# Patient Record
Sex: MALE | Race: OTHER | Employment: OTHER | URBAN - METROPOLITAN AREA
[De-identification: names, ages, dates, MRNs, and addresses within clinical notes are randomized per-mention and may not be internally consistent; named-entity substitution may affect disease eponyms.]

---

## 2018-04-23 ENCOUNTER — OFFICE VISIT (OUTPATIENT)
Dept: FAMILY MEDICINE CLINIC | Age: 78
End: 2018-04-23

## 2018-04-23 ENCOUNTER — HOSPITAL ENCOUNTER (OUTPATIENT)
Dept: LAB | Age: 78
Discharge: HOME | End: 2018-04-23

## 2018-04-23 VITALS
HEART RATE: 91 BPM | HEIGHT: 65 IN | DIASTOLIC BLOOD PRESSURE: 76 MMHG | SYSTOLIC BLOOD PRESSURE: 140 MMHG | BODY MASS INDEX: 30.16 KG/M2 | TEMPERATURE: 98.4 F | WEIGHT: 181 LBS

## 2018-04-23 DIAGNOSIS — E11.9 DM TYPE 2, GOAL A1C TO BE DETERMINED (HCC): Primary | ICD-10-CM

## 2018-04-23 DIAGNOSIS — M17.0 ARTHRITIS OF BOTH KNEES: ICD-10-CM

## 2018-04-23 LAB
ALBUMIN SERPL-MCNC: 4.2 G/DL (ref 3.5–5)
ALBUMIN/GLOB SERPL: 1.1 {RATIO} (ref 1.1–2.2)
ALP SERPL-CCNC: 90 U/L (ref 45–117)
ALT SERPL-CCNC: 109 U/L (ref 12–78)
ANION GAP SERPL CALC-SCNC: 11 MMOL/L (ref 5–15)
AST SERPL-CCNC: 57 U/L (ref 15–37)
BILIRUB SERPL-MCNC: 0.6 MG/DL (ref 0.2–1)
BUN SERPL-MCNC: 24 MG/DL (ref 6–20)
BUN/CREAT SERPL: 23 (ref 12–20)
CALCIUM SERPL-MCNC: 9.1 MG/DL (ref 8.5–10.1)
CHLORIDE SERPL-SCNC: 96 MMOL/L (ref 97–108)
CHOLEST SERPL-MCNC: 190 MG/DL
CO2 SERPL-SCNC: 31 MMOL/L (ref 21–32)
CREAT SERPL-MCNC: 1.03 MG/DL (ref 0.7–1.3)
EST. AVERAGE GLUCOSE BLD GHB EST-MCNC: 183 MG/DL
GLOBULIN SER CALC-MCNC: 4 G/DL (ref 2–4)
GLUCOSE SERPL-MCNC: 129 MG/DL (ref 65–100)
HBA1C MFR BLD: 8 % (ref 4.2–6.3)
HDLC SERPL-MCNC: 77 MG/DL
HDLC SERPL: 2.5 {RATIO} (ref 0–5)
LDLC SERPL CALC-MCNC: 97.4 MG/DL (ref 0–100)
LIPID PROFILE,FLP: NORMAL
POTASSIUM SERPL-SCNC: 4.2 MMOL/L (ref 3.5–5.1)
PROT SERPL-MCNC: 8.2 G/DL (ref 6.4–8.2)
SODIUM SERPL-SCNC: 138 MMOL/L (ref 136–145)
TRIGL SERPL-MCNC: 78 MG/DL (ref ?–150)
VLDLC SERPL CALC-MCNC: 15.6 MG/DL

## 2018-04-23 PROCEDURE — 83036 HEMOGLOBIN GLYCOSYLATED A1C: CPT | Performed by: PHYSICIAN ASSISTANT

## 2018-04-23 PROCEDURE — 80053 COMPREHEN METABOLIC PANEL: CPT | Performed by: PHYSICIAN ASSISTANT

## 2018-04-23 PROCEDURE — 80061 LIPID PANEL: CPT | Performed by: PHYSICIAN ASSISTANT

## 2018-04-23 RX ORDER — GLIMEPIRIDE 4 MG/1
4 TABLET ORAL 2 TIMES DAILY
Qty: 60 TAB | Refills: 2 | Status: SHIPPED | OUTPATIENT
Start: 2018-04-23 | End: 2022-07-21 | Stop reason: ALTCHOICE

## 2018-04-23 RX ORDER — METFORMIN HYDROCHLORIDE 1000 MG/1
1000 TABLET ORAL 2 TIMES DAILY WITH MEALS
Qty: 60 TAB | Refills: 3 | Status: SHIPPED | OUTPATIENT
Start: 2018-04-23 | End: 2018-07-25 | Stop reason: SDUPTHER

## 2018-04-23 RX ORDER — DICLOFENAC SODIUM 75 MG/1
75 TABLET, DELAYED RELEASE ORAL 2 TIMES DAILY
Qty: 60 TAB | Refills: 1 | Status: SHIPPED | OUTPATIENT
Start: 2018-04-23 | End: 2022-07-21 | Stop reason: ALTCHOICE

## 2018-04-23 NOTE — PATIENT INSTRUCTIONS
Artritis: Instrucciones de cuidado - [ Arthritis: Care Instructions ]  Instrucciones de cuidado  La artritis, también llamada osteoartritis, es un desgaste del cartílago que amortigua las articulaciones. Cuando el cartílago se desgasta, los huesos se frotan entre sí. [place] causa dolor y rigidez. Muchas personas tienen algo de artritis a medida que envejecen. Con mayor frecuencia, la artritis afecta las articulaciones de la columna vertebral, las marcus, las [address] pies. Usted puede torrey [employer] sencillas para proteger pepe articulaciones, aliviar el dolor y ayudarle a permanecer activo. La atención de seguimiento es kimberlee parte clave de heller tratamiento y seguridad. Asegúrese de hacer y acudir a todas las citas, y llame a heller médico si está teniendo problemas. También es kimberlee buena idea saber los resultados de los exámenes y mantener kimberlee lista de los medicamentos que serafin. ¿Cómo puede cuidarse en el hogar? · Mantenga un peso saludable. Tener sobrepeso significa un esfuerzo adicional para las articulaciones. · Hable con heller médico o fisioterapeuta acerca de los ejercicios que le ayudarán a aliviar el dolor de las articulaciones. ¨ Estírese. Es posible que pueda disfrutar de formas suaves de yoga para ayudar a mantener flexibles las articulaciones y los músculos. ¨ Camine en lugar de correr. Otros tipos de ejercicio que sobrecargan menos las articulaciones incluyen montar en jahaira Douglass, denise chi o hacer ejercicios en el agua. ¨ Levante pesas. Tener los músculos tanya ayuda a reducir la carga en las articulaciones. Por ejemplo, los músculos más tanya en los muslos reducen parte de la carga sobre las rodillas y las caderas. Aprenda la manera correcta de levantar pesas para no empeorar el dolor de las articulaciones. · [employer] medicamentos exactamente elliot le fueron recetados. Llame a heller médico si silvina estar teniendo problemas con heller medicamento.   · [employer] analgésicos (medicamentos para el dolor) exactamente según las indicaciones. ¨ Si el médico le recetó un analgésico, tómelo según las indicaciones. ¨ Si no está tomando un analgésico recetado, pregúntele a heller médico si puede torrey kristian de [employer]. · Si necesita ayuda para desplazarse, use un bastón, kimberlee Vouni, un andador u otro aparato. Pueden ayudar a descansar las articulaciones. [employer] usar otras cosas para facilitarse la ivy, elliot un asiento de inodoro más alto y asas acolchadas en los utensilios de cocina. · No se siente en justine bajas, ya que puede resultarle difícil ponerse de pie. · Ponga calor o frío sobre las articulaciones adoloridas según sea necesario. Use lo que [employer]. También puede alternar las compresas calientes y frías. ¨ Aplíquese calor 2 o 3 veces al día por entre 20 y 27 minutos, usando kimberlee almohadilla térmica, Ervin Librado ducha caliente o kimberlee compresa caliente, para aliviar el dolor y la rigidez. ¨ Colóquese hielo o kimberlee compresa fría en la articulación adolorida por entre 10 y 21 minutos cada vez. Póngase un paño mark entre el hielo y la piel. ¿Cuándo debe pedir ayuda? Llame a heller médico ahora mismo o busque atención médica inmediata si:  ? · Tiene hinchazón, calor o dolor repentinos en alguna articulación. ? · Marcene Freshwater kimberlee articulación y tiene fiebre o salpullido. ? · El dolor es tan paco que no puede usar kimberlee articulación. ?Preste especial atención a los cambios en heller franca y asegúrese de comunicarse con heller médico si:  ? · Tiene síntomas articulares leves que continúan aun con más de 6 semanas de cuidado en el hogar. ? · Tiene dolor de estómago u otros problemas con pepe medicamentos. ¿Dónde puede encontrar más información en inglés? Edgar Litter a http://benji-tali.info/. Yusef Eid K412 en la búsqueda para aprender más acerca de \"Artritis: Instrucciones de cuidado - [ Arthritis: Care Instructions ]. \"  Revisado: 31 octubre, 2016  Versión del contenido: 11.4  © 5449-2542 Healthwise, Incorporated. Las instrucciones de cuidado fueron adaptadas bajo licencia por [employer] (which disclaims liability or warranty for this information). Si usted tiene [place] afección médica o sobre estas instrucciones, siempre pregunte a heller profesional de franca. Healthwise, Incorporated niega toda garantía o responsabilidad por heller uso de esta información.

## 2018-04-23 NOTE — PROGRESS NOTES
Assessment/Plan:    Diagnoses and all orders for this visit:    1. DM type 2, goal A1C to be determined [facility]  -     LIPID PANEL; Future  -     METABOLIC PANEL, COMPREHENSIVE; Future  -     HEMOGLOBIN A1C WITH EAG; Future  -     metFORMIN (GLUCOPHAGE) 1,000 mg tablet; Take 1 Tab by mouth two (2) times daily (with meals). -     glimepiride (AMARYL) 4 mg tablet; Take 1 Tab by mouth two (2) times a day. This is the dose his doctor has him on  -     insulin NPH (HUMULIN N NPH U-100 INSULIN) 100 unit/mL injection; Si units bid    2. Arthritis of both knees  -     diclofenac EC (VOLTAREN) 75 mg EC tablet; Take 1 Tab by mouth two (2) times a day. por pina perez, nico Alva visiting from [place] and needs to have his medication filled while here. He is on insulin N 20 units bid. He does not have a glucometer to check his blood sugar   Will follow for results of his labs     Follow-up Disposition:  Return if symptoms worsen or fail to improve. Eda Hi PA-C  [employer] expressed understanding of this plan. An AVS was printed and given to the patient.      ----------------------------------------------------------------------    Chief Complaint   Patient presents with    Leg Pain     burning and painful censation in both legs    Diabetes     refills and f/u on his glucose. History of Present Illness:  Pt presents for refill on his meds. Since his last appt here, his doc in [place] started him on N insulin 20 units bid and glimperide and continued on the metformin. I did not change the meds today (he is on 4 mg bid of the glimperide) and 1gm bid of the metformin. He does not have a glucometer. He will f/up with his doc upon his return in a few months. He continues to have significant pain in his medardo knees.  He would like to have medication for this          Past Medical History:   Diagnosis Date    Diabetes [facility])        Current Outpatient Prescriptions Medication Sig Dispense Refill    metFORMIN (GLUCOPHAGE) 1,000 mg tablet Take 1 Tab by mouth two (2) times daily (with meals). 60 Tab 3    glimepiride (AMARYL) 4 mg tablet Take 1 Tab by mouth two (2) times a day. This is the dose his doctor has him on 60 Tab 2    insulin NPH (HUMULIN N NPH U-100 INSULIN) 100 unit/mL injection Si units bid 1 Vial 2    diclofenac EC (VOLTAREN) 75 mg EC tablet Take 1 Tab by mouth two (2) times a day. por heller dolor, tome 1 pastilla dos veces al  charly 60 Tab 1    triamcinolone acetonide (KENALOG) 0.1 % ointment Apply  to affected area two (2) times a day. use thin layer 30 g 0    OTHER,NON-FORMULARY, \"Maritza-Norboral\" Generic: Glibenclamida and Metformina 5mg/1000mg PO BID  Medication from Marcellus Rico         No Known Allergies    Social History   Substance Use Topics    Smoking status: Never Smoker    Smokeless tobacco: Never Used    Alcohol use No       No family history on file. Physical Exam:     Visit Vitals    /76 (BP 1 Location: Right arm, BP Patient Position: Sitting)    Pulse 91    Temp 98.4 °F (36.9 °C) (Oral)    Ht 5' 5.35\" (1.66 m)    Wt 181 lb (82.1 kg)    BMI 29.79 kg/m2     Walks with a cane and his gait is altered due to his knee pain   A&Ox3  WDWN NAD  Respirations normal and non labored  Lab Results   Component Value Date/Time    Hemoglobin A1c 8.2 (H) 2016 02:19 PM     Lab Results   Component Value Date/Time    Sodium 137 2016 02:19 PM    Potassium 4.0 2016 02:19 PM    Chloride 101 2016 02:19 PM    CO2 26 2016 02:19 PM    Anion gap 10 2016 02:19 PM    Glucose 159 (H) 2016 02:19 PM    BUN 13 2016 02:19 PM    Creatinine 0.90 2016 02:19 PM    BUN/Creatinine ratio 14 2016 02:19 PM    GFR est AA >60 2016 02:19 PM    GFR est non-AA >60 2016 02:19 PM    Calcium 8.4 (L) 2016 02:19 PM    Bilirubin, total 0.4 2016 02:19 PM    AST (SGOT) 38 (H) 2016 02:19 PM    Alk. phosphatase 80 04/11/2016 02:19 PM    Protein, total 8.5 (H) 04/11/2016 02:19 PM    Albumin 4.5 04/11/2016 02:19 PM    Globulin 4.0 04/11/2016 02:19 PM    A-G Ratio 1.1 04/11/2016 02:19 PM    ALT (SGPT) 64 04/11/2016 02:19 PM     No results found for: CHOL, CHOLPOCT, CHOLX, CHLST, CHOLV, HDL, HDLPOC, LDL, LDLCPOC, LDLC, DLDLP, VLDLC, VLDL, TGLX, TRIGL, TRIGP, TGLPOCT, CHHD, CHHDX

## 2018-04-23 NOTE — LETTER
4/26/2018 1:52 PM 
 
Mr. [id number] Dear Saravanan Schilling: 
 
Please find your most recent results below. Resulted Orders METABOLIC PANEL, COMPREHENSIVE Result Value Ref Range Sodium 138 136 - 145 mmol/L Potassium 4.2 3.5 - 5.1 mmol/L Chloride 96 (L) 97 - 108 mmol/L  
 CO2 31 21 - 32 mmol/L Anion gap 11 5 - 15 mmol/L Glucose 129 (H) 65 - 100 mg/dL BUN 24 (H) 6 - 20 MG/DL Creatinine 1.03 0.70 - 1.30 MG/DL  
 BUN/Creatinine ratio 23 (H) 12 - 20 GFR est AA >60 >60 ml/min/1.73m2 GFR est non-AA >60 >60 ml/min/1.73m2 Comment:  
   Estimated GFR is calculated using the IDMS-traceable Modification of Diet in Renal Disease (MDRD) Study equation, reported for both  Americans (GFRAA) and non- Americans (GFRNA), and normalized to 1.73m2 body surface area. The physician must decide which value applies to the patient. The MDRD study equation should only be used in individuals age 25 or older. It has not been validated for the following: pregnant women, patients with serious comorbid conditions, or on certain medications, or persons with extremes of body size, muscle mass, or nutritional status. Calcium 9.1 8.5 - 10.1 MG/DL Bilirubin, total 0.6 0.2 - 1.0 MG/DL  
 ALT (SGPT) 109 (H) 12 - 78 U/L  
 AST (SGOT) 57 (H) 15 - 37 U/L Alk. phosphatase 90 45 - 117 U/L Protein, total 8.2 6.4 - 8.2 g/dL Albumin 4.2 3.5 - 5.0 g/dL Globulin 4.0 2.0 - 4.0 g/dL A-G Ratio 1.1 1.1 - 2.2 LIPID PANEL Result Value Ref Range LIPID PROFILE Cholesterol, total 190 <200 MG/DL Triglyceride 78 <150 MG/DL Comment:  
   Based on NCEP-ATP III:  Triglycerides <150 mg/dL  is considered normal, 150-199 mg/dL  borderline high,  200-499 mg/dL high and  greater than or equal to 500 mg/dL very high. HDL Cholesterol 77 MG/DL    Comment:  
   Based on NCEP ATP III, HDL Cholesterol <40 mg/dL is considered low and >60 mg/dL is elevated. LDL, calculated 97.4 0 - 100 MG/DL Comment:  
   Based on the NCEP-ATP: LDL-C concentrations are considered  optimal <100 mg/dL, 
near optimal/above Normal 100-129 mg/dL Borderline High: 130-159, High: 160-189 mg/dL Very High: Greater than or equal to 190 mg/dL VLDL, calculated 15.6 MG/DL  
 CHOL/HDL Ratio 2.5 0 - 5.0 HEMOGLOBIN A1C WITH EAG Result Value Ref Range Hemoglobin A1c 8.0 (H) 4.2 - 6.3 % Est. average glucose 183 mg/dL Comment:  
   (NOTE) The eAG should be interpreted with patient characteristics in mind  
since ethnicity, interindividual differences, red cell lifespan,  
variation in rates of glycation, etc. may affect the validity of the  
calculation. RECOMMENDATIONS: Continue to work on Diet and exercise. Please call me if you have any questions: [phone] Sincerely, SITA Reynolds. 
 
LAB OUTREACH CLIENT

## 2018-04-24 NOTE — PROGRESS NOTES
Gera Marroquin seen at d/c, given AVS and reviewed today's visit with patient. One NPH vial given to patient per verbal order of provider Kimberly DUMONT, logged in the log book. Pt also has goodrx card in case needed. All questions were answered to patient satisfaction.

## 2018-07-25 DIAGNOSIS — E11.9 DM TYPE 2, GOAL A1C TO BE DETERMINED (HCC): ICD-10-CM

## 2018-07-27 RX ORDER — METFORMIN HYDROCHLORIDE 1000 MG/1
TABLET ORAL
Qty: 60 TAB | Refills: 3 | Status: SHIPPED | OUTPATIENT
Start: 2018-07-27 | End: 2022-07-21 | Stop reason: DRUGHIGH

## 2022-07-21 ENCOUNTER — OFFICE VISIT (OUTPATIENT)
Dept: FAMILY MEDICINE CLINIC | Age: 82
End: 2022-07-21

## 2022-07-21 ENCOUNTER — HOSPITAL ENCOUNTER (OUTPATIENT)
Dept: LAB | Age: 82
Discharge: HOME | End: 2022-07-21

## 2022-07-21 VITALS
TEMPERATURE: 97.3 F | SYSTOLIC BLOOD PRESSURE: 152 MMHG | HEIGHT: 65 IN | OXYGEN SATURATION: 95 % | WEIGHT: 162 LBS | DIASTOLIC BLOOD PRESSURE: 76 MMHG | BODY MASS INDEX: 26.99 KG/M2 | HEART RATE: 119 BPM

## 2022-07-21 DIAGNOSIS — E11.65 TYPE 2 DIABETES MELLITUS WITH HYPERGLYCEMIA, WITHOUT LONG-TERM CURRENT USE OF INSULIN (HCC): ICD-10-CM

## 2022-07-21 DIAGNOSIS — E11.65 TYPE 2 DIABETES MELLITUS WITH HYPERGLYCEMIA, WITHOUT LONG-TERM CURRENT USE OF INSULIN (HCC): Primary | ICD-10-CM

## 2022-07-21 DIAGNOSIS — I10 PRIMARY HYPERTENSION: ICD-10-CM

## 2022-07-21 DIAGNOSIS — E11.9 DM TYPE 2, GOAL A1C TO BE DETERMINED (HCC): ICD-10-CM

## 2022-07-21 LAB
ALBUMIN SERPL-MCNC: 3 G/DL (ref 3.5–5)
ALBUMIN/GLOB SERPL: 0.7 {RATIO} (ref 1.1–2.2)
ALP SERPL-CCNC: 138 U/L (ref 45–117)
ALT SERPL-CCNC: 32 U/L (ref 12–78)
ANION GAP SERPL CALC-SCNC: 10 MMOL/L (ref 5–15)
AST SERPL-CCNC: 39 U/L (ref 15–37)
BILIRUB SERPL-MCNC: 1.4 MG/DL (ref 0.2–1)
BUN SERPL-MCNC: 10 MG/DL (ref 6–20)
BUN/CREAT SERPL: 14 (ref 12–20)
CALCIUM SERPL-MCNC: 8.5 MG/DL (ref 8.5–10.1)
CHLORIDE SERPL-SCNC: 97 MMOL/L (ref 97–108)
CO2 SERPL-SCNC: 24 MMOL/L (ref 21–32)
CREAT SERPL-MCNC: 0.72 MG/DL (ref 0.7–1.3)
CREAT UR-MCNC: 76.4 MG/DL
EST. AVERAGE GLUCOSE BLD GHB EST-MCNC: 223 MG/DL
GLOBULIN SER CALC-MCNC: 4.2 G/DL (ref 2–4)
GLUCOSE POC: NORMAL MG/DL
GLUCOSE SERPL-MCNC: 238 MG/DL (ref 65–100)
HBA1C MFR BLD: 9.4 % (ref 4–5.6)
MICROALBUMIN UR-MCNC: 0.78 MG/DL
MICROALBUMIN/CREAT UR-RTO: 10 MG/G (ref 0–30)
POTASSIUM SERPL-SCNC: 3.6 MMOL/L (ref 3.5–5.1)
PROT SERPL-MCNC: 7.2 G/DL (ref 6.4–8.2)
SODIUM SERPL-SCNC: 131 MMOL/L (ref 136–145)

## 2022-07-21 PROCEDURE — 83036 HEMOGLOBIN GLYCOSYLATED A1C: CPT

## 2022-07-21 PROCEDURE — 82043 UR ALBUMIN QUANTITATIVE: CPT

## 2022-07-21 PROCEDURE — 82962 GLUCOSE BLOOD TEST: CPT | Performed by: NURSE PRACTITIONER

## 2022-07-21 PROCEDURE — 3046F HEMOGLOBIN A1C LEVEL >9.0%: CPT | Performed by: NURSE PRACTITIONER

## 2022-07-21 PROCEDURE — 80053 COMPREHEN METABOLIC PANEL: CPT

## 2022-07-21 PROCEDURE — 99215 OFFICE O/P EST HI 40 MIN: CPT | Performed by: NURSE PRACTITIONER

## 2022-07-21 PROCEDURE — 1123F ACP DISCUSS/DSCN MKR DOCD: CPT | Performed by: NURSE PRACTITIONER

## 2022-07-21 RX ORDER — METFORMIN HYDROCHLORIDE 500 MG/1
500 TABLET, EXTENDED RELEASE ORAL
Qty: 90 TABLET | Refills: 0 | Status: SHIPPED | OUTPATIENT
Start: 2022-07-21 | End: 2022-08-29

## 2022-07-21 NOTE — PROGRESS NOTES
Rosa Pantoja seen at d/c, full name and  verified, given After visit Summary and reviewed today's visit with patient along with instructions on when it is recommended to come back. I reviewed the medication list with the patient to ensure he knows how to and when to take his medications. Side effects, mechanisms of action and medication compliance were reiterated to ensure proper understanding. I have reviewed the provider's instructions with the patient, answering all questions to his satisfaction. Patient verbalized understanding.   Linda Mcwilliams RN

## 2022-07-21 NOTE — PROGRESS NOTES
Coordination of Care  1. Have you been to the ER, urgent care clinic since your last visit? Hospitalized since your last visit? Yes When: 2/2022-Katherine-Covid-19    2. Have you seen or consulted any other health care providers outside of the [address] [place] [address] since your last visit? Include any pap smears or colon screening. No    Does the patient need refills? YES    Learning Assessment Complete?  yes  Depression Screening complete in the past 12 months? yes  Results for orders placed or performed in visit on 07/21/22   AMB POC GLUCOSE BLOOD, BY GLUCOSE MONITORING DEVICE   Result Value Ref Range    Glucose -nf MG/DL
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
## 2022-07-25 NOTE — ADDENDUM NOTE
Addended by: Michelle Matamoros on: 7/25/2022 04:09 PM     Modules accepted: Orders
Follow up in 6 weeks with Dr. Reis

## 2022-07-28 ENCOUNTER — TELEPHONE (OUTPATIENT)
Dept: FAMILY MEDICINE CLINIC | Age: 82
End: 2022-07-28

## 2022-07-28 NOTE — TELEPHONE ENCOUNTER
Tc to the pt to give him his message from the provider regarding his lab results. AMN Int # [id number]. The person who answered was the Dtr David Peace. She is not on the [facility] she got Breanna Thayer who is on the [facility] she spoke [place]. She verified her name and the pt's name and . She was given the providers message regarding the abnormal lab results, and NPH insulin increase to 30 units sc qam. She verbalized understanding.  Nitin Darby RN

## 2022-08-19 ENCOUNTER — OFFICE VISIT (OUTPATIENT)
Dept: FAMILY MEDICINE CLINIC | Age: 82
End: 2022-08-19

## 2022-08-19 VITALS
SYSTOLIC BLOOD PRESSURE: 133 MMHG | HEIGHT: 65 IN | OXYGEN SATURATION: 99 % | DIASTOLIC BLOOD PRESSURE: 72 MMHG | TEMPERATURE: 98 F | BODY MASS INDEX: 27.16 KG/M2 | HEART RATE: 89 BPM

## 2022-08-19 DIAGNOSIS — H61.23 BILATERAL IMPACTED CERUMEN: ICD-10-CM

## 2022-08-19 DIAGNOSIS — Z13.0 SCREENING FOR DEFICIENCY ANEMIA: ICD-10-CM

## 2022-08-19 DIAGNOSIS — E11.65 TYPE 2 DIABETES MELLITUS WITH HYPERGLYCEMIA, WITHOUT LONG-TERM CURRENT USE OF INSULIN (HCC): Primary | ICD-10-CM

## 2022-08-19 LAB
BILIRUB UR QL STRIP: NEGATIVE
GLUCOSE POC: NORMAL MG/DL
GLUCOSE UR-MCNC: NORMAL MG/DL
HGB BLD-MCNC: 12.6 G/DL
KETONES P FAST UR STRIP-MCNC: NORMAL MG/DL
PH UR STRIP: 6.5 [PH] (ref 4.6–8)
PROT UR QL STRIP: NEGATIVE
SP GR UR STRIP: 1.01 (ref 1–1.03)
UA UROBILINOGEN AMB POC: NORMAL (ref 0.2–1)
URINALYSIS CLARITY POC: CLEAR
URINALYSIS COLOR POC: NORMAL
URINE BLOOD POC: NEGATIVE
URINE LEUKOCYTES POC: NEGATIVE
URINE NITRITES POC: NEGATIVE

## 2022-08-19 PROCEDURE — 81002 URINALYSIS NONAUTO W/O SCOPE: CPT | Performed by: NURSE PRACTITIONER

## 2022-08-19 PROCEDURE — 3046F HEMOGLOBIN A1C LEVEL >9.0%: CPT | Performed by: NURSE PRACTITIONER

## 2022-08-19 PROCEDURE — 99214 OFFICE O/P EST MOD 30 MIN: CPT | Performed by: NURSE PRACTITIONER

## 2022-08-19 PROCEDURE — 85018 HEMOGLOBIN: CPT | Performed by: NURSE PRACTITIONER

## 2022-08-19 PROCEDURE — 82962 GLUCOSE BLOOD TEST: CPT | Performed by: NURSE PRACTITIONER

## 2022-08-19 PROCEDURE — 1123F ACP DISCUSS/DSCN MKR DOCD: CPT | Performed by: NURSE PRACTITIONER

## 2022-08-19 NOTE — PROGRESS NOTES
An After Visit Summary was printed and given to the patient. Patient insulin instructions were reviewed with the patient. I reviewed the medication list with the patient to ensure he knows how to and when to take his medications. Side effects, mechanisms of action and medication compliance were reiterated to ensure proper understanding. I have reviewed the provider's instructions with the patient, answering all questions to his satisfaction. Patient verbalized understanding.   Jerome Aldridge RN

## 2022-08-19 NOTE — PROGRESS NOTES
2022 : Melody [facility] (: 1940) is a [de-identified] y.o. male, established patient, here for evaluation of the following chief complaint(s):  Diabetes (Follow up) and Ear Fullness (Patient c/o pain and fullness on both ears)     ASSESSMENT/PLAN:  Below is the assessment and plan developed based on review of pertinent history, physical exam, labs, studies, and medications. 1. Type 2 diabetes mellitus with hyperglycemia, without long-term current use of insulin ([facility])  -     AMB POC GLUCOSE BLOOD, BY GLUCOSE MONITORING DEVICE  -     AMB POC URINALYSIS DIP STICK MANUAL W/O MICRO  -     insulin NPH (HumuLIN N NPH U-100 Insulin) 100 unit/mL injection; Si units sc qam, Normal, Disp-10 mL, R-1NEW dose  2. Bilateral impacted cerumen  3. Screening for deficiency anemia  -     AMB POC HEMOGLOBIN (HGB)  He does not wake up at night to pee. Granddaughter here with him today, says he eats well. [place] on [place] (). He has always injected his insulin in the morning. Return for bilateral ear wash 1 week F2F LK. SUBJECTIVE/OBJECTIVE:  HPI Ears started 2 weeks ago. Cleaning his ears did not affect it. It's a sound.     Results for orders placed or performed in visit on 22   AMB POC GLUCOSE BLOOD, BY GLUCOSE MONITORING DEVICE   Result Value Ref Range    Glucose  fating MG/DL   AMB POC HEMOGLOBIN (HGB)   Result Value Ref Range    Hemoglobin (POC) 12.6 G/DL   AMB POC URINALYSIS DIP STICK MANUAL W/O MICRO   Result Value Ref Range    Color (UA POC) Dark Yellow     Clarity (UA POC) Clear     Glucose (UA POC) 1+ Negative    Bilirubin (UA POC) Negative Negative    Ketones (UA POC) Trace Negative    Specific gravity (UA POC) 1.015 1.001 - 1.035    Blood (UA POC) Negative Negative    pH (UA POC) 6.5 4.6 - 8.0    Protein (UA POC) Negative Negative    Urobilinogen (UA POC) 2 mg/dL 0.2 - 1    Nitrites (UA POC) Negative Negative    Leukocyte esterase (UA POC) Negative Negative Latest Lab Result Choices:   Lab Results   Component Value Date/Time    Hemoglobin A1c 9.4 (H) 2022 12:48 PM    Hemoglobin A1c 8.0 (H) 2018 12:04 PM    Hemoglobin A1c 8.2 (H) 2016 02:19 PM    Glucose 238 (H) 2022 12:48 PM    Glucose  reyes 2022 11:15 AM    Microalbumin/Creat ratio (mg/g creat) 10 2022 12:48 PM    Microalbumin,urine random 0.78 2022 12:48 PM    LDL, calculated 97.4 2018 12:04 PM    Creatinine 0.72 2022 12:48 PM      Review of Systems: + weight loss  General: No fever. Eyes: No blurry or double vision. Cardiovascular: No chest pain, dyspnea, or TIAs. Endocrine: No polydipsia or polyphagia. No hypoglycemic symptoms. Respiratory: No shortness of breath. Musculoskeletal: No myalgias. Genitourinary: No polyuria. Neurological: No numbness/tingling/pain in extremities. Extremities: No swelling. Social History:  reports that he has never smoked. He has never used smokeless tobacco. He reports that he does not drink alcohol and does not use drugs. Current Medications:   Current Outpatient Medications   Medication Sig    insulin syringe-needle U-100 0.3 mL 31 gauge x 15/64\" syrg USE 1 ONCE DAILY    insulin NPH (HumuLIN N NPH U-100 Insulin) 100 unit/mL injection Si units sc qam    metFORMIN ER (GLUCOPHAGE XR) 500 mg tablet Take 1 Tablet by mouth daily (with dinner). insulin syringe-needle U-100 0.3 mL 30 gauge x 15/64\" syrg 1 Syringe by Does Not Apply route daily (with breakfast). Inject as directed. Physical Examination:   Vitals:    22 1059   BP: 133/72   Pulse: 89   Temp: 98 °F (36.7 °C)   TempSrc: Temporal   SpO2: 99%   Height: 5' 4.76\" (1.645 m)     Wt Readings from Last 3 Encounters:   22 162 lb (73.5 kg)   18 181 lb (82.1 kg)   16 183 lb (83 kg)       General appearance - well developed, no acute distress. Bilaterally impacted cerumen. Chest - clear to auscultation.   Heart - regular rate and rhythm without murmurs, rubs, or gallops. Abdomen - bowel sounds present x 4, NT, ND  Extremities - no CCE. An electronic signature was used to authenticate this note.   -- Leigh Marcano, NP

## 2022-08-19 NOTE — PROGRESS NOTES
Coordination of Care  1. Have you been to the ER, urgent care clinic since your last visit? Hospitalized since your last visit? No    2. Have you seen or consulted any other health care providers outside of the [address] [place] [address] since your last visit? Include any pap smears or colon screening. No    Does the patient need refills? YES    Learning Assessment Complete?  yes  Depression Screening complete in the past 12 months? yes    Results for orders placed or performed in visit on 08/19/22   AMB POC GLUCOSE BLOOD, BY GLUCOSE MONITORING DEVICE   Result Value Ref Range    Glucose  fating MG/DL   AMB POC HEMOGLOBIN (HGB)   Result Value Ref Range    Hemoglobin (POC) 12.6 G/DL   AMB POC URINALYSIS DIP STICK MANUAL W/O MICRO   Result Value Ref Range    Color (UA POC) Dark Yellow     Clarity (UA POC) Clear     Glucose (UA POC) 1+ Negative    Bilirubin (UA POC) Negative Negative    Ketones (UA POC) Trace Negative    Specific gravity (UA POC) 1.015 1.001 - 1.035    Blood (UA POC) Negative Negative    pH (UA POC) 6.5 4.6 - 8.0    Protein (UA POC) Negative Negative    Urobilinogen (UA POC) 2 mg/dL 0.2 - 1    Nitrites (UA POC) Negative Negative    Leukocyte esterase (UA POC) Negative Negative

## 2022-08-20 DIAGNOSIS — E11.65 TYPE 2 DIABETES MELLITUS WITH HYPERGLYCEMIA, WITHOUT LONG-TERM CURRENT USE OF INSULIN (HCC): ICD-10-CM

## 2022-08-29 RX ORDER — METFORMIN HYDROCHLORIDE 500 MG/1
TABLET, EXTENDED RELEASE ORAL
Qty: 90 TABLET | Refills: 0 | Status: SHIPPED | OUTPATIENT
Start: 2022-08-29 | End: 2022-09-02 | Stop reason: SDUPTHER

## 2022-09-02 ENCOUNTER — OFFICE VISIT (OUTPATIENT)
Dept: FAMILY MEDICINE CLINIC | Age: 82
End: 2022-09-02

## 2022-09-02 VITALS
DIASTOLIC BLOOD PRESSURE: 67 MMHG | OXYGEN SATURATION: 97 % | TEMPERATURE: 98 F | WEIGHT: 162 LBS | BODY MASS INDEX: 27.16 KG/M2 | HEART RATE: 89 BPM | SYSTOLIC BLOOD PRESSURE: 130 MMHG

## 2022-09-02 DIAGNOSIS — E11.65 TYPE 2 DIABETES MELLITUS WITH HYPERGLYCEMIA, WITHOUT LONG-TERM CURRENT USE OF INSULIN (HCC): Primary | ICD-10-CM

## 2022-09-02 DIAGNOSIS — H61.893 IRRITATION OF EXTERNAL EAR CANAL, BILATERAL: ICD-10-CM

## 2022-09-02 LAB — GLUCOSE POC: NORMAL MG/DL

## 2022-09-02 PROCEDURE — 3046F HEMOGLOBIN A1C LEVEL >9.0%: CPT | Performed by: NURSE PRACTITIONER

## 2022-09-02 PROCEDURE — 99214 OFFICE O/P EST MOD 30 MIN: CPT | Performed by: NURSE PRACTITIONER

## 2022-09-02 PROCEDURE — 82962 GLUCOSE BLOOD TEST: CPT | Performed by: NURSE PRACTITIONER

## 2022-09-02 PROCEDURE — 1123F ACP DISCUSS/DSCN MKR DOCD: CPT | Performed by: NURSE PRACTITIONER

## 2022-09-02 RX ORDER — METFORMIN HYDROCHLORIDE 500 MG/1
500 TABLET, EXTENDED RELEASE ORAL
Qty: 90 TABLET | Refills: 0 | Status: SHIPPED | OUTPATIENT
Start: 2022-09-02

## 2022-09-02 RX ORDER — NEOMYCIN SULFATE, POLYMYXIN B SULFATE AND HYDROCORTISONE 10; 3.5; 1 MG/ML; MG/ML; [USP'U]/ML
3 SUSPENSION/ DROPS AURICULAR (OTIC) 4 TIMES DAILY
Qty: 10 ML | Refills: 0 | Status: SHIPPED | OUTPATIENT
Start: 2022-09-02

## 2022-09-02 NOTE — PROGRESS NOTES
Coordination of Care  1. Have you been to the ER, urgent care clinic since your last visit? Hospitalized since your last visit? No    2. Have you seen or consulted any other health care providers outside of the [address] [place] [address] since your last visit? Include any pap smears or colon screening. No    Does the patient need refills?  NO    Learning Assessment Complete? yes  Results for orders placed or performed in visit on 09/02/22   AMB POC GLUCOSE BLOOD, BY GLUCOSE MONITORING DEVICE   Result Value Ref Range    Glucose POC 285nf MG/DL

## 2022-09-02 NOTE — PROGRESS NOTES
2022 : Kate Medico (: 1940) is a [de-identified] y.o. male, established patient, here for evaluation of the following chief complaint(s):  Procedure (Ear wash) and Medication Refill (No med for 1 week)  I was present for key parts of this examination. I agree with NP student's exam and findings. Max Kumari NP    ASSESSMENT/PLAN:  Below is the assessment and plan developed based on review of pertinent history, physical exam, labs, studies, and medications. 1. Type 2 diabetes mellitus with hyperglycemia, without long-term current use of insulin (HCC)  -     AMB POC GLUCOSE BLOOD, BY GLUCOSE MONITORING DEVICE  -     insulin NPH (HumuLIN N NPH U-100 Insulin) 100 unit/mL injection; Si units sc qam, Normal, Disp-10 mL, R-1Please dispense generic at $25 price point per vial  -     metFORMIN ER (GLUCOPHAGE XR) 500 mg tablet; Take 1 Tablet by mouth daily (with dinner). , Normal, Disp-90 Tablet, R-0  2. Irritation of external ear canal, bilateral  -     neomycin-polymyxin-hydrocortisone, buffered, (PEDIOTIC) 3.5-10,000-1 mg/mL-unit/mL-% otic suspension; Administer 3 Drops into each ear four (4) times daily. , Normal, Disp-10 mL, R-0  NPH insulin inceased. Will follow up with his doctor back home. Return for to see your doctor at home. SUBJECTIVE/OBJECTIVE:  HPI Out of metformin for 1 week. Results for orders placed or performed in visit on 22   AMB POC GLUCOSE BLOOD, BY GLUCOSE MONITORING DEVICE   Result Value Ref Range    Glucose POC 285nf MG/DL   303 fasting last time, now 285 nnfasting. 35 units sc qam NPH - 42 units sc qam  Why not on metformin?   mINERAL OIL, put in ear canal 20 min once per week  Latest Lab Result Choices:   Lab Results   Component Value Date/Time    Hemoglobin A1c 9.4 (H) 2022 12:48 PM    Hemoglobin A1c 8.0 (H) 2018 12:04 PM    Hemoglobin A1c 8.2 (H) 2016 02:19 PM    Glucose 238 (H) 2022 12:48 PM    Glucose POC 285nf 2022 10:57 AM Microalbumin/Creat ratio (mg/g creat) 10 2022 12:48 PM    Microalbumin,urine random 0.78 2022 12:48 PM    LDL, calculated 97.4 2018 12:04 PM    Creatinine 0.72 2022 12:48 PM    Left medial knee swollen from when he bumped it on the airplane. Review of Systems:   General: No fever. Eyes: No blurry or double vision. Cardiovascular: No chest pain, dyspnea, or TIAs. Endocrine: No polydipsia or polyphagia. No weight loss. No hypoglycemic symptoms. Respiratory: No shortness of breath. Musculoskeletal: No myalgias. Genitourinary: No polyuria. Neurological: No numbness/tingling in extremities. Extremities: No swelling. Social History:  reports that he has never smoked. He has never used smokeless tobacco. He reports that he does not drink alcohol and does not use drugs. Current Medications:   Current Outpatient Medications   Medication Sig    insulin NPH (HumuLIN N NPH U-100 Insulin) 100 unit/mL injection Si units sc qam    metFORMIN ER (GLUCOPHAGE XR) 500 mg tablet Take 1 Tablet by mouth daily (with dinner). neomycin-polymyxin-hydrocortisone, buffered, (PEDIOTIC) 3.5-10,000-1 mg/mL-unit/mL-% otic suspension Administer 3 Drops into each ear four (4) times daily. insulin syringe-needle U-100 0.3 mL 31 gauge x 15/64\" syrg USE 1 ONCE DAILY    insulin syringe-needle U-100 0.3 mL 30 gauge x 15/64\" syrg 1 Syringe by Does Not Apply route daily (with breakfast). Inject as directed. Physical Examination:   Vitals:    22 1048   BP: 130/67   Pulse: 89   Temp: 98 °F (36.7 °C)   TempSrc: Temporal   SpO2: 97%   Weight: 162 lb (73.5 kg)   After lavage, TMs clear. General appearance - well developed, no acute distress. Chest - clear to auscultation. Heart - regular rate and rhythm without murmurs, rubs, or gallops. Abdomen - bowel sounds present x 4, NT, ND  Extremities - no CCE. An electronic signature was used to authenticate this note.   -- Liam Castanon NP

## 2022-09-02 NOTE — PROGRESS NOTES
AVS printed and reviewed with patient. GoodRx coupon for ear drops at [facility] and given to patient. No GoodRx coupon needed for Metformin since the retail price there for 90 tablets is $6.08.  Per Mandy's instructions, patient was advised to use Tyleon 650mg PRN every 8 hours for knee pain. Advised to continue Insulin 42units qam.  Patient indicated understanding and appreciated the service today.

## 2022-09-02 NOTE — PATIENT INSTRUCTIONS
HbA1c (%) Estimated Average Glucose (mg/dL)    5.0 97       5.5 111       6.0 126       6.5 140       7.0 154       7.5 169       8.0 183       8.5 197       9.0 212       9.5 226       10.0 240       10.5 255       11.0 269       11.5 283       12.0 298      Metas de Glucosa  Antes de comer  90 o 100 hasta 130  2 horas despues de comer menos de 180  A1c meta 8.0%  Lab Results   Component Value Date/Time    Glucose 238 (H) 07/21/2022 12:48 PM    Glucose POC 285nf 09/02/2022 10:57 AM     Lab Results   Component Value Date/Time    Hemoglobin A1c 9.4 (H) 07/21/2022 12:48 PM    Hemoglobin A1c 8.0 (H) 04/23/2018 12:04 PM    Hemoglobin A1c 8.2 (H) 04/11/2016 02:19 PM

## 2022-09-02 NOTE — PROGRESS NOTES
Subjective Jose Merlinda Cancer is a [de-identified] y.o. male. Patient here for a bilateral ear lavage. Patient stated that he has had a decrease in hearing for the last three weeks. Came into the clinic for a visit last week and was Pediotic drops for impacted serum in bilateral ears. Patient stated that hearing has improved slightly in bilateral ears since using ear drops. Review of Systems   Constitutional:  Negative for fever, malaise/fatigue and weight loss. HENT:  Positive for hearing loss. Negative for ear discharge, ear pain and tinnitus. Eyes:  Negative for blurred vision, pain and discharge. Respiratory:  Negative for cough, sputum production and shortness of breath. Cardiovascular:  Negative for chest pain and orthopnea. Gastrointestinal:  Negative for abdominal pain, constipation, diarrhea and heartburn. Genitourinary:  Negative for dysuria and urgency. Musculoskeletal:  Positive for joint pain. Negative for back pain and falls. Patient stated he injured both knees on the plain ride to [facility]   Skin:  Positive for itching. Negative for rash. Neurological:  Negative for dizziness, weakness and headaches. Endo/Heme/Allergies:  Does not bruise/bleed easily. Psychiatric/Behavioral:  Negative for depression and memory loss. The patient is not nervous/anxious. Objective  Blood pressure 130/67, pulse 89, temperature 98 °F (36.7 °C), temperature source Temporal, weight 162 lb (73.5 kg), SpO2 97 %. Physical Exam  Constitutional:       Appearance: Normal appearance. He is normal weight. HENT:      Head: Normocephalic. Right Ear: There is impacted cerumen. Left Ear: There is impacted cerumen. Nose: Nose normal.      Mouth/Throat:      Mouth: Mucous membranes are moist.      Pharynx: Oropharynx is clear. Eyes:      Pupils: Pupils are equal, round, and reactive to light. Cardiovascular:      Rate and Rhythm: Normal rate and regular rhythm. Pulses: Normal pulses. Heart sounds: Normal heart sounds. Pulmonary:      Effort: Pulmonary effort is normal.      Breath sounds: Normal breath sounds. Musculoskeletal:         General: Tenderness and signs of injury present. Comments: Bilateral knees   Skin:     Findings: Lesion present. Comments: Red linear scabbed abrasions on bilateral lower extremities    Neurological:      Mental Status: He is alert and oriented to person, place, and time. Gait: Gait abnormal.   Psychiatric:         Mood and Affect: Mood normal.         Behavior: Behavior normal.      Assessment & Plan    ICD-10-CM ICD-9-CM    1. Type 2 diabetes mellitus with hyperglycemia, without long-term current use of insulin ([facility])  E11.65 250.00 AMB POC GLUCOSE BLOOD, BY GLUCOSE MONITORING DEVICE     790.29 insulin NPH (HumuLIN N NPH U-100 Insulin) 100 unit/mL injection      metFORMIN ER (GLUCOPHAGE XR) 500 mg tablet      2. Irritation of external ear canal, bilateral  H61.893 380.89 neomycin-polymyxin-hydrocortisone, buffered, (PEDIOTIC) 3.5-10,000-1 mg/mL-unit/mL-% otic suspension      3.       Bilateral Ear lavage bilateral ears done today in clinic to remove impacted serum  Follow up in three months    April Beach